# Patient Record
Sex: FEMALE | Race: WHITE
[De-identification: names, ages, dates, MRNs, and addresses within clinical notes are randomized per-mention and may not be internally consistent; named-entity substitution may affect disease eponyms.]

---

## 2017-02-10 ENCOUNTER — HOSPITAL ENCOUNTER (OUTPATIENT)
Dept: HOSPITAL 39 - MAMMO | Age: 62
Discharge: HOME | End: 2017-02-10
Attending: FAMILY MEDICINE
Payer: COMMERCIAL

## 2017-02-10 DIAGNOSIS — Z12.31: Primary | ICD-10-CM

## 2017-02-12 NOTE — MAM
EXAM DESCRIPTION:

MAMMO BREAST SCREENING BILATERAL

CAD, images were reviewed with CAD technology, R2 computer-aided

detection.



CLINICAL HISTORY:

[facility].



COMPARISON:

2013 and 2015.



FINDINGS:

Routine views are obtained.  Scattered glandular pattern with the

increased mammographic density and diffuse nodularity period single-view

asymmetry is demonstrated in this very nodular breast corresponding to

the axillary portion of the left breast, indicated on the MLO film.  No

microcalcifications or architectural distortion is confirmed.  Glandular

pattern on the right remains stable.



IMPRESSION:

Incomplete study.



BIRAD CATEGORY: 0 INCOMPLETE



RECOMMENDATIONS:

FOLLOW-UP:  True lateral and axillary tail views of the left breast.

Additional views and sonography as indicated.



According to the [employer] College [employer], yearly mammograms are

recommended starting at age 40 and continuing as long as a woman is in

good health.  Any breast change noted on a breast self-exam should be

reported promptly to the patient's healthcare provider.  Breast MRI is

recommended for women with an approximately 20-25% or greater lifetime

risk of breast cancer, including women with a strong family history of

breast or ovarian cancer and women who have been treated for Hodgkin's

disease.



Electronically signed by: Macarena Phillips  02/12/2017 12:38

## 2017-04-25 ENCOUNTER — HOSPITAL ENCOUNTER (OUTPATIENT)
Dept: HOSPITAL 39 - MAMMO | Age: 62
End: 2017-04-25
Attending: FAMILY MEDICINE
Payer: COMMERCIAL

## 2017-04-25 DIAGNOSIS — N64.89: Primary | ICD-10-CM

## 2017-05-08 NOTE — MAM
EXAM DESCRIPTION:



Diagnostic Mammo,Left



CLINICAL HISTORY:



61 years, Female, ABNORMAL MAMMO 



COMPARISON:



Screening mammogram February 10, 2017. Earlier mammograms dating

back to 2013



FINDINGS:



CAD used spot compression views of the axillary tail region were

obtained. Additionally rotated axillary view obtained. No

suspicious mass lesion seen here. Repeat MLO view the left breast

did not show the asymmetry previously noted



IMPRESSION:

No mammographic evidence of malignancy. On additional views,

previously noted asymmetry left axillary tail could not be

confirmed. Asymmetry may have been compression or summation

artifact. In any case this is considered benign finding and

standard annual mammographic follow-up should suffice.



Category 2: Benign finding



Electronically signed by:  Fawad Hu MD  5/1/2017 3:07 PM CDT

## 2018-11-13 ENCOUNTER — HOSPITAL ENCOUNTER (OUTPATIENT)
Dept: HOSPITAL 39 - MAMMO | Age: 63
End: 2018-11-13
Attending: FAMILY MEDICINE
Payer: COMMERCIAL

## 2018-11-13 DIAGNOSIS — Z12.31: Primary | ICD-10-CM

## 2018-11-14 NOTE — MAM
EXAM DESCRIPTION: 

3D Screening BILATERAL : Digital Mammography.



CLINICAL HISTORY: 

63 years Female ANNUAL SCREENING . No complaints. No personal or

family history of breast cancer. No childbirth. Postmenopausal.

No HRT.  Lifetime risk of developing breast cancer (Tyrer-Cuzick

model)(%):  7.2.



COMPARISON: 

2-D digital screening bilateral mammography 2/10/2017. Digital

diagnostic left mammography 4/25/2017..   



TECHNIQUE: 

Bilateral CC and MLO projection full-field images, digital

tomosynthesis mammographic technique. Bilateral digital 2-D

full-field MLO images. CAD not available for tomosynthesis or 2-D

images.  



FINDINGS: 

The breast parenchymal density pattern is:   Heterogeneously

dense breast tissue, which may obscure small masses.  No skin

thickening or nipple retraction.  Few bilateral solitary

microcalcifications.

No new focal, stellate mass or density, focal asymmetry , and no

suspicious microcalcifications bilaterally. Stable mammograms

compared to prior study.  Taking into account, differences in

mammographic technique.



IMPRESSION: 

Benign exam.



BIRAD CATEGORY: 2 BENIGN FINDINGS.



RECOMMENDATIONS:

FOLLOW UP: Routine digital bilateral  mammographic screening, one

year interval from  November 2018.



Written communication explaining the IMPRESSION and follow-up,

will be mailed to the patient and referring health care provider.



According to the American College of [employer], yearly mammograms

are recommended starting at age 40 and continuing as long as a

woman is in good health.  Any breast change noted on a breast

self-exam should be reported promptly to the patient's healthcare

provider.  Breast MRI is recommended for women with an

approximately 20-25% or greater lifetime risk of breast cancer,

including women with a strong family history of breast or ovarian

cancer and women who have been treated for Hodgkin's disease.



A negative mammographic report should not delay tissue diagnosis

in patients with significant clinical history or physical

findings.



Extremely dense breast tissue limits the sensitivity of digital

mammography. 



Electronically signed by:  Thiago Hernandez MD  11/14/2018 12:31 PM

CST Workstation: [id number]

## 2020-06-17 ENCOUNTER — HOSPITAL ENCOUNTER (OUTPATIENT)
Dept: HOSPITAL 39 - MAMMO | Age: 65
End: 2020-06-17
Attending: FAMILY MEDICINE
Payer: COMMERCIAL

## 2020-06-17 DIAGNOSIS — Z12.31: Primary | ICD-10-CM

## 2021-01-16 NOTE — CT
TECHNIQUE: Soft Tissue Neck Computerized axial tomography of the

soft tissue neck was performed following the IV injection of

iodinated nonionic contrast. Coronal and sagittal reconstructed

imaging provided.

Automated exposure control, adjustment of the mA and/or kV

according to patient size, and/or use of iterative reconstruction

technique.

CT Dose Length Product:  246.41 mGy-cm. CTDI vol: 11.01 mGy.

 

HISTORY: MAIN with IV contrast.  Right facial swelling



COMPARISONS: None currently available.



FINDINGS:

Right facial soft tissue swelling starts at the level of the

maxilla and and at the level of the mandible. No extension into

the submandibular space is evident. No fluid collection or

enhancing mass identified. No obvious underlying dental lesion is

evident. The mandible and maxilla appear well corticated without

destruction or periosteal reaction to suggest osteomyelitis. The

platysmas muscle slightly inflamed. The submandibular gland

appears intact. The masseter muscle appears intact. Right parotid

gland and duct appear uninvolved. No



A few prominent right level 1B lymph nodes identified. Mildly

prominent right level 2 lymph nodes identified.



Nasopharynx: No enlarged tonsils. No nasal cavity lesions

identified. No mass.

Oropharynx: Independence and lingual tonsils are within normal

limits. Floor of the mouth is unremarkable.

Pharynx: Epiglottis is unremarkable. Aryepiglottic folds are

unremarkable.

Larynx: True and false cords are symmetrical. No significant

airway compromise.



Vascular structures are unremarkable. No aneurysm. No dissection.

No filling defects.

No mass lesions identified.



Parotid glands demonstrate normal appearance. 

The submandibular glands are unremarkable. 

The thyroid gland is unremarkable. 



Partially imaged paranasal sinuses are unremarkable. 

Partially imaged temporal bones are unremarkable. 

Cervical spondylosis.

Partial images of the lungs are unremarkable.



IMPRESSION:

*  Right facial soft tissue swelling between the maxilla mandible

without submandibular space extension. No subcutaneous abscess or

lesion. No obvious dental pathology on CT.

*  A few reactive lymph nodes in the right level 1B and 2

regions.



Electronically signed by:  Jarrett Martin  1/16/2021 2:58 PM [facility]

Workstation: [id number]

## 2021-01-16 NOTE — ED.PDOC
History of Present Illness





- General


Stated Complaint: right facial swelling


Time Seen by Provider: 01/16/21 13:39


Source: patient, RN notes reviewed, Vital Signs reviewed, family


Exam Limitations: no limitations





- History of Present Illness


Initial Comments: 





Pt presents to ED for right facial pain and swelling.  States her pain began 2 

weeks ago and was seen by her dentist and an oral surgeon with no cause found 

and has an appointment with a doctor in [facility] next week for evaluation.

 States she has been on Azithromycin for this for the past 7 days w/o 

improvement.  Denies fever, chills, tooth pain, difficulty swallowing or 

breathing.  Has been taking motrin at home for pain.


Allergies/Adverse Reactions: 


Allergies





Penicillins Allergy (Verified 01/16/21 13:48)


   





Home Medications: 


Ambulatory Orders





Acetaminophen W/ Codeine [Tylenol W/ CODEINE #3] 1 tablet PO Q6H PRN 4 Days #20 

01/16/21 


Clindamycin HCl [Cleocin] 300 mg PO Q6H 10 Days  capsule 01/16/21 











Review of Systems





- Review of Systems


Constitutional: Denies: chills, fever


EENTM: States: other - right facial swelling.  Denies: blurred vision, ear pain,

 nose pain, nose congestion, throat pain, throat swelling, mouth pain


Respiratory: Denies: cough, short of breath


Cardiology: Denies: edema, palpitations, syncope


Gastrointestinal/Abdominal: Denies: abdominal pain, diarrhea, nausea, vomiting


Musculoskeletal: Denies: back pain


Skin: States: no symptoms reported


All other Systems: Reviewed and Negative





Family Medical History





- Family History


  ** Mother


Family History: Unknown





Physical Exam





- Physical Exam


General Appearance: Alert, Anxious, No apparent distress


Ears, Nose, Throat: normal pharynx, other - Bilateral TM's normal.  OP has no 

erythema, edema or exudates.  No dental tenderness of abscesss.  There is right 

facial swelling to lower cheek with no erythema, warmth or fluctuance


Neck: non-tender, full range of motion, supple


Respiratory: chest non-tender, lungs clear, normal breath sounds, no respiratory

 distress


Cardiovascular/Chest: regular rate, rhythm, no murmur


Gastrointestinal/Abdominal: non tender, soft, no pulsatile mass


Back Exam: no CVA tenderness, no vertebral tenderness


Extremity: normal range of motion, non-tender, no pedal edema


Neurologic: CNs II-XII nml as tested, no motor/sensory deficits, alert, normal 

mood/affect


Skin Exam: normal color, warm/dry





Progress





- Progress


Progress: 





01/16/21 15:40


Pt presents with right facial swelling for about 2 weeks.  CT shows edema with 

no abscess.  Will stop Azithro and start Clindamycin.  I have discussed possible

 sal;ivary gland obstruction vs infection.  Will try sour candy and warm 

compress and f/u with oral surgeon this week as scheduled.





- Results/Orders


Results/Orders: 





CT soft tissue neck


 IMPRESSION: * Right facial soft tissue swelling between the maxilla mandible 

without submandibular space extension. No subcutaneous abscess or lesion. No 

obvious dental pathology on CT. * A few reactive lymph nodes in the right level 

1B and 2 regions.





                         Laboratory Results - last 24 hr











  01/16/21 01/16/21





  13:57 13:57


 


WBC  11.8 H 


 


RBC  3.75 L 


 


Hgb  11.0 L 


 


Hct  33.3 L 


 


MCV  88.8 


 


MCH  29.3 


 


MCHC  33.1 


 


RDW  14.0 


 


Plt Count  304 


 


MPV  7.3 L 


 


Absolute Neuts (auto)  9.00 H 


 


Absolute Lymphs (auto)  1.50 


 


Absolute Monos (auto)  1.10 H 


 


Absolute Eos (auto)  0.10 


 


Absolute Basos (auto)  0.10 


 


Neutrophils %  76.3 


 


Lymphocytes %  12.5 L 


 


Monocytes %  9.5 H 


 


Eosinophils %  1.2 


 


Basophils %  0.5 


 


Sodium   131 L


 


Potassium   4.3


 


Chloride   97 L


 


Carbon Dioxide   25


 


Anion Gap   13.3


 


BUN   8


 


Creatinine   0.81


 


BUN/Creatinine Ratio   9.9 L


 


Random Glucose   109 H


 


Serum Osmolality   261.6 L


 


Calcium   10.4 H














Departure





- Departure


Clinical Impression: 


 Salivary gland infection, Right facial swelling





Time of Disposition: 15:42


Disposition: Discharge to Home or Self Care


Condition: Good


Instructions:  Salivary Gland Infection (DC)


Activity: increase activity as tolerated


Referrals: 


FELICITA COBB IV, NP [Primary Care Provider] - 1-2 Days


Prescriptions: 


Clindamycin HCl [Cleocin] 300 mg PO Q6H 10 Days  capsule


Acetaminophen W/ Codeine [Tylenol W/ CODEINE #3] 1 tablet PO Q6H PRN 4 Days #20 


 PRN Reason: Pain


Home Medications: 


Ambulatory Orders





Acetaminophen W/ Codeine [Tylenol W/ CODEINE #3] 1 tablet PO Q6H PRN 4 Days #20 

01/16/21 


Clindamycin HCl [Cleocin] 300 mg PO Q6H 10 Days  capsule 01/16/21